# Patient Record
Sex: FEMALE | Race: WHITE | NOT HISPANIC OR LATINO | Employment: FULL TIME | URBAN - METROPOLITAN AREA
[De-identification: names, ages, dates, MRNs, and addresses within clinical notes are randomized per-mention and may not be internally consistent; named-entity substitution may affect disease eponyms.]

---

## 2023-04-22 ENCOUNTER — HOSPITAL ENCOUNTER (EMERGENCY)
Facility: CLINIC | Age: 28
Discharge: HOME | End: 2023-04-22
Attending: EMERGENCY MEDICINE | Admitting: EMERGENCY MEDICINE
Payer: COMMERCIAL

## 2023-04-22 ENCOUNTER — APPOINTMENT (OUTPATIENT)
Dept: ULTRASOUND IMAGING | Facility: CLINIC | Age: 28
End: 2023-04-22
Attending: EMERGENCY MEDICINE
Payer: COMMERCIAL

## 2023-04-22 VITALS
DIASTOLIC BLOOD PRESSURE: 94 MMHG | SYSTOLIC BLOOD PRESSURE: 146 MMHG | TEMPERATURE: 97.4 F | HEART RATE: 86 BPM | OXYGEN SATURATION: 99 % | RESPIRATION RATE: 18 BRPM

## 2023-04-22 DIAGNOSIS — O03.4 INCOMPLETE ABORTION: ICD-10-CM

## 2023-04-22 LAB
ABO/RH(D): NORMAL
ALBUMIN UR-MCNC: NEGATIVE MG/DL
ANION GAP SERPL CALCULATED.3IONS-SCNC: 11 MMOL/L (ref 7–15)
ANTIBODY SCREEN: NEGATIVE
APPEARANCE UR: CLEAR
BASOPHILS # BLD AUTO: 0.1 10E3/UL (ref 0–0.2)
BASOPHILS NFR BLD AUTO: 0 %
BILIRUB UR QL STRIP: NEGATIVE
BUN SERPL-MCNC: 10.1 MG/DL (ref 6–20)
CALCIUM SERPL-MCNC: 9.5 MG/DL (ref 8.6–10)
CHLORIDE SERPL-SCNC: 98 MMOL/L (ref 98–107)
COLOR UR AUTO: ABNORMAL
CREAT SERPL-MCNC: 0.64 MG/DL (ref 0.51–0.95)
DEPRECATED HCO3 PLAS-SCNC: 27 MMOL/L (ref 22–29)
EOSINOPHIL # BLD AUTO: 0 10E3/UL (ref 0–0.7)
EOSINOPHIL NFR BLD AUTO: 0 %
ERYTHROCYTE [DISTWIDTH] IN BLOOD BY AUTOMATED COUNT: 11.1 % (ref 10–15)
GFR SERPL CREATININE-BSD FRML MDRD: >90 ML/MIN/1.73M2
GLUCOSE SERPL-MCNC: 93 MG/DL (ref 70–99)
GLUCOSE UR STRIP-MCNC: NEGATIVE MG/DL
HCG INTACT+B SERPL-ACNC: 8234 MIU/ML
HCG UR QL: POSITIVE
HCT VFR BLD AUTO: 37.9 % (ref 35–47)
HGB BLD-MCNC: 13.1 G/DL (ref 11.7–15.7)
HGB UR QL STRIP: ABNORMAL
HOLD SPECIMEN: NORMAL
HOLD SPECIMEN: NORMAL
IMM GRANULOCYTES # BLD: 0 10E3/UL
IMM GRANULOCYTES NFR BLD: 0 %
KETONES UR STRIP-MCNC: NEGATIVE MG/DL
LEUKOCYTE ESTERASE UR QL STRIP: NEGATIVE
LYMPHOCYTES # BLD AUTO: 1.8 10E3/UL (ref 0.8–5.3)
LYMPHOCYTES NFR BLD AUTO: 15 %
MCH RBC QN AUTO: 32 PG (ref 26.5–33)
MCHC RBC AUTO-ENTMCNC: 34.6 G/DL (ref 31.5–36.5)
MCV RBC AUTO: 92 FL (ref 78–100)
MONOCYTES # BLD AUTO: 0.6 10E3/UL (ref 0–1.3)
MONOCYTES NFR BLD AUTO: 5 %
NEUTROPHILS # BLD AUTO: 9.5 10E3/UL (ref 1.6–8.3)
NEUTROPHILS NFR BLD AUTO: 80 %
NITRATE UR QL: NEGATIVE
NRBC # BLD AUTO: 0 10E3/UL
NRBC BLD AUTO-RTO: 0 /100
PH UR STRIP: 6.5 [PH] (ref 5–7)
PLATELET # BLD AUTO: 213 10E3/UL (ref 150–450)
POTASSIUM SERPL-SCNC: 4.1 MMOL/L (ref 3.4–5.3)
RBC # BLD AUTO: 4.1 10E6/UL (ref 3.8–5.2)
RBC URINE: <1 /HPF
SODIUM SERPL-SCNC: 136 MMOL/L (ref 136–145)
SP GR UR STRIP: 1.01 (ref 1–1.03)
SPECIMEN EXPIRATION DATE: NORMAL
SQUAMOUS EPITHELIAL: <1 /HPF
UROBILINOGEN UR STRIP-MCNC: NORMAL MG/DL
WBC # BLD AUTO: 12.1 10E3/UL (ref 4–11)
WBC URINE: <1 /HPF

## 2023-04-22 PROCEDURE — 99284 EMERGENCY DEPT VISIT MOD MDM: CPT | Mod: 25

## 2023-04-22 PROCEDURE — 86901 BLOOD TYPING SEROLOGIC RH(D): CPT | Performed by: EMERGENCY MEDICINE

## 2023-04-22 PROCEDURE — 80048 BASIC METABOLIC PNL TOTAL CA: CPT | Performed by: EMERGENCY MEDICINE

## 2023-04-22 PROCEDURE — 81025 URINE PREGNANCY TEST: CPT | Performed by: EMERGENCY MEDICINE

## 2023-04-22 PROCEDURE — 85025 COMPLETE CBC W/AUTO DIFF WBC: CPT | Performed by: EMERGENCY MEDICINE

## 2023-04-22 PROCEDURE — 36415 COLL VENOUS BLD VENIPUNCTURE: CPT | Performed by: EMERGENCY MEDICINE

## 2023-04-22 PROCEDURE — 84702 CHORIONIC GONADOTROPIN TEST: CPT | Performed by: EMERGENCY MEDICINE

## 2023-04-22 PROCEDURE — 85014 HEMATOCRIT: CPT | Performed by: EMERGENCY MEDICINE

## 2023-04-22 PROCEDURE — 76805 OB US >/= 14 WKS SNGL FETUS: CPT

## 2023-04-22 PROCEDURE — 86850 RBC ANTIBODY SCREEN: CPT | Performed by: EMERGENCY MEDICINE

## 2023-04-22 PROCEDURE — 81001 URINALYSIS AUTO W/SCOPE: CPT | Performed by: EMERGENCY MEDICINE

## 2023-04-22 ASSESSMENT — ENCOUNTER SYMPTOMS
HEMATURIA: 0
DIFFICULTY URINATING: 0
ABDOMINAL PAIN: 1
DYSURIA: 0
FREQUENCY: 0
CHILLS: 0
FEVER: 0
VOMITING: 0
NAUSEA: 0

## 2023-04-22 ASSESSMENT — ACTIVITIES OF DAILY LIVING (ADL): ADLS_ACUITY_SCORE: 35

## 2023-04-22 NOTE — ED TRIAGE NOTES
Patient presents to the ED reporting vaginal bleeding. 7 weeks pregnant. States has had dark brown spotting x 4 days. States bleeding became heavier and more red today.

## 2023-04-23 NOTE — ED PROVIDER NOTES
History     Chief Complaint:  Vaginal Bleeding - Pregnant       HPI   Diane Figueroa is a 27 year old female, , currently 7 weeks pregnant who presents with three days of vaginal bleeding. Patient states that she initially had some light dark brown spotting but says the bleeding became heavier and more red today. She then felt a large gush of bright red blood this afternoon which has persisted. She adds that she had two ultrasounds last week which showed a gestational sac but no heartbeat. She also had her quantitative HCG tested two times over the past week, which was 14,000 and then 15,000. Patient has a follow up US scheduled with her OBGYN in two days. Diane endorses mild abdominal cramping for the past week as well, though has not had any severe abdominal pain. Denies nausea, vomiting, fevers, and urinary symptoms. Of note, her LMP was 2/10. Patient currently lives in [place] with her  and is in town visiting her brother.         ROS:  Review of Systems   Constitutional: Negative for fever.   Gastrointestinal: Positive for abdominal pain (mild cramping). Negative for nausea and vomiting.   Genitourinary: Positive for vaginal bleeding. Negative for decreased urine volume, difficulty urinating, dysuria, frequency, hematuria and urgency.   All other systems reviewed and are negative.    Allergies:  Sulfa Drugs     Medications:    Anxiety medication     Past Medical History:    Anxiety     Social History:  Arrives via private vehicle with her .   Lives in [place]     Physical Exam     Patient Vitals for the past 24 hrs:   BP Temp Pulse Resp SpO2   23 1841 (!) 146/94 97.4  F (36.3  C) 86 18 99 %        Physical Exam  Constitutional:       General: She is not in acute distress.     Appearance: Normal appearance. She is not diaphoretic.   HENT:      Head: Atraumatic.      Mouth/Throat:      Mouth: Mucous membranes are moist.   Eyes:      General: No scleral icterus.      Conjunctiva/sclera: Conjunctivae normal.   Cardiovascular:      Rate and Rhythm: Normal rate and regular rhythm.      Heart sounds: Normal heart sounds.   Pulmonary:      Effort: No respiratory distress.      Breath sounds: Normal breath sounds.   Abdominal:      General: Abdomen is flat.      Tenderness: There is no abdominal tenderness.   Musculoskeletal:      Cervical back: Neck supple.   Skin:     General: Skin is warm.      Capillary Refill: Capillary refill takes less than 2 seconds.      Findings: No rash.   Neurological:      Mental Status: She is alert.   Psychiatric:      Comments: Somewhat tearful           Emergency Department Course     Imaging:  US OB > 14 Weeks Complete w Transvaginal   Final Result   IMPRESSION:    1.  Single intrauterine gestation sac with no visible yolk sac or embryonic pole is somewhat misshapen and appears to contain several thin internal septations. Gestational age by mean sac diameter is 5 weeks 5 days.    2.  Follow-up ultrasound and correlation with quantitative serum beta hCG level would be helpful.         Report per radiology    Laboratory:  Labs Ordered and Resulted from Time of ED Arrival to Time of ED Departure   ROUTINE UA WITH MICROSCOPIC REFLEX TO CULTURE - Abnormal       Result Value    Color Urine Straw      Appearance Urine Clear      Glucose Urine Negative      Bilirubin Urine Negative      Ketones Urine Negative      Specific Gravity Urine 1.008      Blood Urine Moderate (*)     pH Urine 6.5      Protein Albumin Urine Negative      Urobilinogen Urine Normal      Nitrite Urine Negative      Leukocyte Esterase Urine Negative      RBC Urine <1      WBC Urine <1      Squamous Epithelials Urine <1     HCG QUALITATIVE URINE - Abnormal    hCG Urine Qualitative Positive (*)    HCG QUANTITATIVE PREGNANCY - Abnormal    hCG Quantitative 8,234 (*)    CBC WITH PLATELETS AND DIFFERENTIAL - Abnormal    WBC Count 12.1 (*)     RBC Count 4.10      Hemoglobin 13.1      Hematocrit  37.9      MCV 92      MCH 32.0      MCHC 34.6      RDW 11.1      Platelet Count 213      % Neutrophils 80      % Lymphocytes 15      % Monocytes 5      % Eosinophils 0      % Basophils 0      % Immature Granulocytes 0      NRBCs per 100 WBC 0      Absolute Neutrophils 9.5 (*)     Absolute Lymphocytes 1.8      Absolute Monocytes 0.6      Absolute Eosinophils 0.0      Absolute Basophils 0.1      Absolute Immature Granulocytes 0.0      Absolute NRBCs 0.0     BASIC METABOLIC PANEL - Normal    Sodium 136      Potassium 4.1      Chloride 98      Carbon Dioxide (CO2) 27      Anion Gap 11      Urea Nitrogen 10.1      Creatinine 0.64      Calcium 9.5      Glucose 93      GFR Estimate >90     TYPE AND SCREEN, ADULT    ABO/RH(D) O POS      Antibody Screen Negative      SPECIMEN EXPIRATION DATE [id number]     ABO/RH TYPE AND SCREEN          Emergency Department Course & Assessments:      Assessments:  2020 I obtained history and examined the patient as noted above. I also explained findings at this time.       Disposition:  The patient was discharged to home.     Impression & Plan      Medical Decision Making:  This patient is a 27-year-old woman who likely is having spontaneous .  She is from [place] and I do not have access to her records but she is very well-informed.  She had an ultrasound previously that showed a gestational sac and yolk sac but no fetal pole.  She had a quantitative hCG last week that went from 14,000-15,000.  She has been having intermittent spotting.  She is here visiting.    The patient had an episode of increased bleeding that is now tapered off.  She is hemodynamically stable.  No abdominal tenderness.  Hemoglobin is normal.  She is Rh+.    Her ultrasound still demonstrates yolk sac but no fetal pole or heart rate.  This is concerning at this point given that her quantitative hCG is above the discriminatory zone.  Also concerning is that her hCG appears to have down trended from her  most recent check in at 7 weeks she should be continuing to increase.    The patient is stable for discharge.  She will rest and return if she is worsening.  She has a follow-up ultrasound and recheck in 2 days which she will keep.        Diagnosis:    ICD-10-CM    1. Incomplete   O03.4              Scribe Disclosure:  I, Lisseth Duartealado, am serving as a scribe at 7:58 PM on 2023 to document services personally performed by Juan Sharp MD based on my observations and the provider's statements to me.   2023   Juan Sharp MD McRoberts, Sean Edward, MD  [phone]

## 2023-04-23 NOTE — DISCHARGE INSTRUCTIONS
Return to the ER for dizziness, heavy bleeding, or any new concerns.    Please plan to keep your follow-up appointment in 2 days as arranged.

## 2023-04-23 NOTE — ED NOTES
OB/GYN/Reproductive (Adult) Additional Documentation:  (pt comes in pregnant with bright red vaginal bleeding for last few days.)

## 2023-04-23 NOTE — ED PROVIDER NOTES
History     Chief Complaint:  Vaginal Bleeding - Pregnant     HPI   Diane Figueroa is a 27 year old female with a history of anxiety who presents with Vaginal bleeding in the first trimester of pregnancy.  She first developed vaginal bleeding last Wednesday, 10 days ago.  She went to the ER in [place] where she is from.  Based on her last menstrual period she believes she was 9 weeks, but she was only measuring at 6 weeks at this time.  Ultrasound showed gestational sac and yolk sac but no heartbeat.  Cervix was closed. Beta HCG showed 14,000 last Wednesday and then 16,000 last Friday. Over the past 44 days she has had brown, drier looking blood Today, she had a dry pad all day until after work she felt a large gush of bright blood and has been slowly bleeding since. Reports mild abdominal cramping over the past week.    Denies nausea or vomiting.  Reports head cold symptoms, which she thinks she contracted from her nephew whom she is visiting currently.  Denies fevers.  Denies previous pregnancies or childbirth.  She is otherwise healthy.  LMP 2/10/2023.    Independent Historian:   None - Patient Only    Review of External Notes: None    ROS:  Review of Systems   Constitutional: Negative for chills and fever.       Allergies:  Sulfa Drugs     Medications:    No current outpatient medications on file.      Past Medical History:    No past medical history on file.    Past Surgical History:    No past surgical history on file.     Family History:    family history is not on file.    Social History:     PCP: No primary care provider on file.     Physical Exam   Patient Vitals for the past 24 hrs:   BP Temp Pulse Resp SpO2   04/22/23 1841 (!) 146/94 97.4  F (36.3  C) 86 18 99 %        Physical Exam  Vital signs and nursing notes reviewed.    General:  Alert and oriented, no acute distress. Tearful appearing. Resting on bed with partner at bedside.   Skin: Skin is warm and dry. No rashes, lesions, or erythema. No  diaphoresis.  HEENT:   Head: Normocephalic, atraumatic. Facial features symmetric.   Eyes: Conjunctiva pink, sclera white. EOMs grossly intact.   Ears: Auricles without lesion, erythema, or edema.   Nose: Symmetric with no discharge.  Mouth and throat: Lips are moist with no lesions or edema.  Neck: Normal range of motion. No tracheal deviation.   CV:  Heart RRR with no murmurs or extra heart sounds. 2+ radial and tibialis posterior pulses bilaterally. No peripheral edema.  Pulm/Chest: Chest wall expansion symmetric with no increased effort of breathing. Lungs clear and equal to auscultation bilaterally.   Abd: Bowel sounds present and physiologic. Abdomen is soft and nontender to palpation in all 4 quadrants with no guarding or rebound.   M/S: Moves all extremities spontaneously.  Psych: Behavior is normal.     Emergency Department Course     Imaging:  US OB > 14 Weeks Complete w Transvaginal   Final Result   IMPRESSION:    1.  Single intrauterine gestation sac with no visible yolk sac or embryonic pole is somewhat misshapen and appears to contain several thin internal septations. Gestational age by mean sac diameter is 5 weeks 5 days.    2.  Follow-up ultrasound and correlation with quantitative serum beta hCG level would be helpful.         Report per radiology    Laboratory:  Labs Ordered and Resulted from Time of ED Arrival to Time of ED Departure   ROUTINE UA WITH MICROSCOPIC REFLEX TO CULTURE - Abnormal       Result Value    Color Urine Straw      Appearance Urine Clear      Glucose Urine Negative      Bilirubin Urine Negative      Ketones Urine Negative      Specific Gravity Urine 1.008      Blood Urine Moderate (*)     pH Urine 6.5      Protein Albumin Urine Negative      Urobilinogen Urine Normal      Nitrite Urine Negative      Leukocyte Esterase Urine Negative      RBC Urine <1      WBC Urine <1      Squamous Epithelials Urine <1     HCG QUALITATIVE URINE - Abnormal    hCG Urine Qualitative Positive (*)     HCG QUANTITATIVE PREGNANCY - Abnormal    hCG Quantitative 8,234 (*)    CBC WITH PLATELETS AND DIFFERENTIAL - Abnormal    WBC Count 12.1 (*)     RBC Count 4.10      Hemoglobin 13.1      Hematocrit 37.9      MCV 92      MCH 32.0      MCHC 34.6      RDW 11.1      Platelet Count 213      % Neutrophils 80      % Lymphocytes 15      % Monocytes 5      % Eosinophils 0      % Basophils 0      % Immature Granulocytes 0      NRBCs per 100 WBC 0      Absolute Neutrophils 9.5 (*)     Absolute Lymphocytes 1.8      Absolute Monocytes 0.6      Absolute Eosinophils 0.0      Absolute Basophils 0.1      Absolute Immature Granulocytes 0.0      Absolute NRBCs 0.0     BASIC METABOLIC PANEL - Normal    Sodium 136      Potassium 4.1      Chloride 98      Carbon Dioxide (CO2) 27      Anion Gap 11      Urea Nitrogen 10.1      Creatinine 0.64      Calcium 9.5      Glucose 93      GFR Estimate >90     TYPE AND SCREEN, ADULT    ABO/RH(D) O POS      Antibody Screen Negative      SPECIMEN EXPIRATION DATE [id number]     ABO/RH TYPE AND SCREEN        Procedures   None    Emergency Department Course & Assessments:         Interventions:  Medications - No data to display     Independent Interpretation (X-rays, CTs, rhythm strip):  None    Consultations/Discussion of Management or Tests:  None        Social Determinants of Health affecting care:   None    Disposition:  The patient was discharged to home.     Impression & Plan    CMS Diagnoses: None    Medical Decision Making:  Diane Figueroa is a 27 year old female who presents for evaluation of vaginal bleeding in the setting of early pregnancy. See HPI. Vital signs stable.  Patient is from [place] and here visiting family; we do not have access to her previous medical records but patient is good historian.  Abdominal exam benign, no anemia, Rh+ lab. Quantitative Hcg is 8,234, which is less than her reported previous lab 1 week ago of 16,000. US reveals intrauterine pregnancy with  gestational sac but no yolk sac, fetal pole, or heart rate.  Given ongoing bleeding and decreasing quantitative hCG, she is likely having spontaneous incomplete .  Patient has previously scheduled ultrasound for Monday, we recommended she keep this appointment and follow-up as previously arranged.  She can discharge home at this time is agreeable to plan.  Instructed to return to the emergency department should she develop dizziness, syncope, intractable vomiting, or other concerning symptoms.    I staffed this patient with Dr. Sharp who is agreeable to the assessment and plan above.    Critical Care time:  was 0 minutes for this patient excluding procedures.    Diagnosis:    ICD-10-CM    1. Incomplete   O03.4            Discharge Medications:  New Prescriptions    No medications on file        Stephanie Tony PA-C on 2023 at 11:02 PM         Stephanie Tony PA-C  [phone]